# Patient Record
Sex: MALE | Race: WHITE | NOT HISPANIC OR LATINO | Employment: UNEMPLOYED | URBAN - METROPOLITAN AREA
[De-identification: names, ages, dates, MRNs, and addresses within clinical notes are randomized per-mention and may not be internally consistent; named-entity substitution may affect disease eponyms.]

---

## 2019-01-01 ENCOUNTER — HOSPITAL ENCOUNTER (INPATIENT)
Facility: HOSPITAL | Age: 59
LOS: 1 days | End: 2019-02-11
Attending: EMERGENCY MEDICINE | Admitting: PSYCHIATRY & NEUROLOGY
Payer: MEDICAID

## 2019-01-01 VITALS
WEIGHT: 287 LBS | HEIGHT: 72 IN | DIASTOLIC BLOOD PRESSURE: 62 MMHG | BODY MASS INDEX: 38.87 KG/M2 | SYSTOLIC BLOOD PRESSURE: 134 MMHG | TEMPERATURE: 98 F

## 2019-01-01 DIAGNOSIS — S06.5XAA SDH (SUBDURAL HEMATOMA): ICD-10-CM

## 2019-01-01 DIAGNOSIS — R41.89 UNRESPONSIVE: ICD-10-CM

## 2019-01-01 LAB
ABO + RH BLD: NORMAL
ALBUMIN SERPL BCP-MCNC: 1.8 G/DL
ALLENS TEST: ABNORMAL
ALP SERPL-CCNC: 79 U/L
ALT SERPL W/O P-5'-P-CCNC: 58 U/L
ANION GAP SERPL CALC-SCNC: 6 MMOL/L
ANISOCYTOSIS BLD QL SMEAR: SLIGHT
ANISOCYTOSIS BLD QL SMEAR: SLIGHT
APTT BLDCRRT: 28.5 SEC
AST SERPL-CCNC: 29 U/L
AV INDEX (PROSTH): 0.99
AV MEAN GRADIENT: 9.2 MMHG
AV PEAK GRADIENT: 15.84 MMHG
AV VALVE AREA: 3.16 CM2
AV VELOCITY RATIO: 0.86
BACTERIA #/AREA URNS AUTO: NORMAL /HPF
BASOPHILS # BLD AUTO: 0.01 K/UL
BASOPHILS NFR BLD: 0 %
BASOPHILS NFR BLD: 0.1 %
BILIRUB SERPL-MCNC: 0.7 MG/DL
BILIRUB UR QL STRIP: NEGATIVE
BLD GP AB SCN CELLS X3 SERPL QL: NORMAL
BLD PROD TYP BPU: NORMAL
BLOOD UNIT EXPIRATION DATE: NORMAL
BLOOD UNIT TYPE CODE: 6200
BLOOD UNIT TYPE: NORMAL
BSA FOR ECHO PROCEDURE: 2.57 M2
BUN SERPL-MCNC: 41 MG/DL
CALCIUM SERPL-MCNC: 8.5 MG/DL
CHLORIDE SERPL-SCNC: 106 MMOL/L
CHOLEST SERPL-MCNC: 86 MG/DL
CHOLEST/HDLC SERPL: 3.6 {RATIO}
CLARITY UR REFRACT.AUTO: CLEAR
CO2 SERPL-SCNC: 25 MMOL/L
CODING SYSTEM: NORMAL
COLOR UR AUTO: YELLOW
CREAT SERPL-MCNC: 2.3 MG/DL
CV ECHO LV RWT: 0.29 CM
DELSYS: ABNORMAL
DIFFERENTIAL METHOD: ABNORMAL
DIFFERENTIAL METHOD: ABNORMAL
DISPENSE STATUS: NORMAL
DOP CALC AO PEAK VEL: 1.99 M/S
DOP CALC AO VTI: 30.39 CM
DOP CALC LVOT AREA: 3.2 CM2
DOP CALC LVOT DIAMETER: 2.02 CM
DOP CALC LVOT PEAK VEL: 1.7 M/S
DOP CALC LVOT STROKE VOLUME: 96.03 CM3
DOP CALCLVOT PEAK VEL VTI: 29.98 CM
E WAVE DECELERATION TIME: 187.75 MSEC
E/A RATIO: 1.04
E/E' RATIO: 8.45
ECHO LV POSTERIOR WALL: 0.8 CM (ref 0.6–1.1)
EOSINOPHIL # BLD AUTO: 0.1 K/UL
EOSINOPHIL NFR BLD: 0.5 %
EOSINOPHIL NFR BLD: 1 %
ERYTHROCYTE [DISTWIDTH] IN BLOOD BY AUTOMATED COUNT: 18.4 %
ERYTHROCYTE [DISTWIDTH] IN BLOOD BY AUTOMATED COUNT: 18.5 %
ERYTHROCYTE [SEDIMENTATION RATE] IN BLOOD BY WESTERGREN METHOD: 24 MM/H
EST. GFR  (AFRICAN AMERICAN): 34.9 ML/MIN/1.73 M^2
EST. GFR  (NON AFRICAN AMERICAN): 30.2 ML/MIN/1.73 M^2
ESTIMATED AVG GLUCOSE: 137 MG/DL
FIO2: 60
FRACTIONAL SHORTENING: 38 % (ref 28–44)
GIANT PLATELETS BLD QL SMEAR: PRESENT
GLUCOSE SERPL-MCNC: 125 MG/DL
GLUCOSE UR QL STRIP: NEGATIVE
HBA1C MFR BLD HPLC: 6.4 %
HCO3 UR-SCNC: 25.7 MMOL/L (ref 24–28)
HCT VFR BLD AUTO: 22.5 %
HCT VFR BLD AUTO: 22.7 %
HDLC SERPL-MCNC: 24 MG/DL
HDLC SERPL: 27.9 %
HGB BLD-MCNC: 6.9 G/DL
HGB BLD-MCNC: 7 G/DL
HGB UR QL STRIP: ABNORMAL
HYALINE CASTS UR QL AUTO: 1 /LPF
HYPOCHROMIA BLD QL SMEAR: ABNORMAL
IMM GRANULOCYTES # BLD AUTO: 0.13 K/UL
IMM GRANULOCYTES # BLD AUTO: ABNORMAL K/UL
IMM GRANULOCYTES NFR BLD AUTO: 1.3 %
IMM GRANULOCYTES NFR BLD AUTO: ABNORMAL %
INR PPP: 1.1
INTERVENTRICULAR SEPTUM: 0.72 CM (ref 0.6–1.1)
KETONES UR QL STRIP: NEGATIVE
LA MAJOR: 4.89 CM
LA MINOR: 4.76 CM
LA WIDTH: 4.04 CM
LACTATE SERPL-SCNC: 0.9 MMOL/L
LDLC SERPL CALC-MCNC: 33 MG/DL
LEFT ATRIUM SIZE: 2.85 CM
LEFT ATRIUM VOLUME INDEX: 19 ML/M2
LEFT ATRIUM VOLUME: 47.21 CM3
LEFT INTERNAL DIMENSION IN SYSTOLE: 3.41 CM (ref 2.1–4)
LEFT VENTRICLE DIASTOLIC VOLUME INDEX: 59.86 ML/M2
LEFT VENTRICLE DIASTOLIC VOLUME: 148.68 ML
LEFT VENTRICLE MASS INDEX: 60.8 G/M2
LEFT VENTRICLE SYSTOLIC VOLUME INDEX: 19.2 ML/M2
LEFT VENTRICLE SYSTOLIC VOLUME: 47.76 ML
LEFT VENTRICULAR INTERNAL DIMENSION IN DIASTOLE: 5.52 CM (ref 3.5–6)
LEFT VENTRICULAR MASS: 150.96 G
LEUKOCYTE ESTERASE UR QL STRIP: NEGATIVE
LV LATERAL E/E' RATIO: 8.45
LV SEPTAL E/E' RATIO: 8.45
LYMPHOCYTES # BLD AUTO: 2.3 K/UL
LYMPHOCYTES NFR BLD: 22.3 %
LYMPHOCYTES NFR BLD: 84 %
MAGNESIUM SERPL-MCNC: 1.4 MG/DL
MCH RBC QN AUTO: 29.9 PG
MCH RBC QN AUTO: 31.1 PG
MCHC RBC AUTO-ENTMCNC: 30.7 G/DL
MCHC RBC AUTO-ENTMCNC: 30.8 G/DL
MCV RBC AUTO: 101 FL
MCV RBC AUTO: 97 FL
MICROSCOPIC COMMENT: NORMAL
MIN VOL: 10.2
MODE: ABNORMAL
MONOCYTES # BLD AUTO: 7.2 K/UL
MONOCYTES NFR BLD: 1 %
MONOCYTES NFR BLD: 70.2 %
MV PEAK A VEL: 0.89 M/S
MV PEAK E VEL: 0.93 M/S
NEUTROPHILS # BLD AUTO: 0.6 K/UL
NEUTROPHILS NFR BLD: 5 %
NEUTROPHILS NFR BLD: 5.6 %
NEUTS BAND NFR BLD MANUAL: 1 %
NITRITE UR QL STRIP: NEGATIVE
NONHDLC SERPL-MCNC: 62 MG/DL
NRBC BLD-RTO: 0 /100 WBC
NRBC BLD-RTO: 0 /100 WBC
OVALOCYTES BLD QL SMEAR: ABNORMAL
OVALOCYTES BLD QL SMEAR: ABNORMAL
PATH REV BLD -IMP: NORMAL
PCO2 BLDA: 51.1 MMHG (ref 35–45)
PEEP: 5
PH SMN: 7.31 [PH] (ref 7.35–7.45)
PH UR STRIP: 6 [PH] (ref 5–8)
PIP: 37
PLATELET # BLD AUTO: 15 K/UL
PLATELET # BLD AUTO: 42 K/UL
PLATELET BLD QL SMEAR: ABNORMAL
PMV BLD AUTO: 10.2 FL
PMV BLD AUTO: 11.4 FL
PO2 BLDA: 151 MMHG (ref 80–100)
POC BE: -1 MMOL/L
POC SATURATED O2: 99 % (ref 95–100)
POC TCO2: 27 MMOL/L (ref 23–27)
POCT GLUCOSE: 119 MG/DL (ref 70–110)
POCT GLUCOSE: 136 MG/DL (ref 70–110)
POIKILOCYTOSIS BLD QL SMEAR: SLIGHT
POIKILOCYTOSIS BLD QL SMEAR: SLIGHT
POLYCHROMASIA BLD QL SMEAR: ABNORMAL
POTASSIUM SERPL-SCNC: 4.5 MMOL/L
PROT SERPL-MCNC: 9.6 G/DL
PROT UR QL STRIP: ABNORMAL
PROTHROMBIN TIME: 11.4 SEC
PULM VEIN S/D RATIO: 1.43
PV PEAK D VEL: 0.42 M/S
PV PEAK S VEL: 0.6 M/S
RBC # BLD AUTO: 2.22 M/UL
RBC # BLD AUTO: 2.34 M/UL
RBC #/AREA URNS AUTO: 0 /HPF (ref 0–4)
SAMPLE: ABNORMAL
SINUS: 2.97 CM
SITE: ABNORMAL
SMUDGE CELLS BLD QL SMEAR: PRESENT
SODIUM SERPL-SCNC: 137 MMOL/L
SP GR UR STRIP: 1.01 (ref 1–1.03)
SP02: 97
TDI LATERAL: 0.11
TDI SEPTAL: 0.11
TDI: 0.11
TRANS PLATPHERESIS VOL PATIENT: NORMAL ML
TRIGL SERPL-MCNC: 145 MG/DL
TROPONIN I SERPL DL<=0.01 NG/ML-MCNC: 0.02 NG/ML
TSH SERPL DL<=0.005 MIU/L-ACNC: 0.58 UIU/ML
TSH SERPL DL<=0.005 MIU/L-ACNC: 0.58 UIU/ML
URN SPEC COLLECT METH UR: ABNORMAL
VT: 500
WBC # BLD AUTO: 10.32 K/UL
WBC # BLD AUTO: 9.49 K/UL
WBC #/AREA URNS AUTO: 1 /HPF (ref 0–5)

## 2019-01-01 PROCEDURE — 94002 VENT MGMT INPAT INIT DAY: CPT

## 2019-01-01 PROCEDURE — 82962 GLUCOSE BLOOD TEST: CPT

## 2019-01-01 PROCEDURE — 84443 ASSAY THYROID STIM HORMONE: CPT

## 2019-01-01 PROCEDURE — 27000221 HC OXYGEN, UP TO 24 HOURS

## 2019-01-01 PROCEDURE — P9035 PLATELET PHERES LEUKOREDUCED: HCPCS

## 2019-01-01 PROCEDURE — 99900026 HC AIRWAY MAINTENANCE (STAT)

## 2019-01-01 PROCEDURE — 82803 BLOOD GASES ANY COMBINATION: CPT

## 2019-01-01 PROCEDURE — 96375 TX/PRO/DX INJ NEW DRUG ADDON: CPT

## 2019-01-01 PROCEDURE — 80061 LIPID PANEL: CPT

## 2019-01-01 PROCEDURE — A4216 STERILE WATER/SALINE, 10 ML: HCPCS | Performed by: NURSE PRACTITIONER

## 2019-01-01 PROCEDURE — 25000003 PHARM REV CODE 250: Performed by: NURSE PRACTITIONER

## 2019-01-01 PROCEDURE — 83036 HEMOGLOBIN GLYCOSYLATED A1C: CPT

## 2019-01-01 PROCEDURE — 99283 EMERGENCY DEPT VISIT LOW MDM: CPT | Mod: ,,, | Performed by: NURSE PRACTITIONER

## 2019-01-01 PROCEDURE — 36430 TRANSFUSION BLD/BLD COMPNT: CPT

## 2019-01-01 PROCEDURE — 85027 COMPLETE CBC AUTOMATED: CPT

## 2019-01-01 PROCEDURE — 63600175 PHARM REV CODE 636 W HCPCS: Performed by: EMERGENCY MEDICINE

## 2019-01-01 PROCEDURE — 25000003 PHARM REV CODE 250: Performed by: PHYSICIAN ASSISTANT

## 2019-01-01 PROCEDURE — 99233 SBSQ HOSP IP/OBS HIGH 50: CPT | Mod: ,,, | Performed by: NEUROLOGICAL SURGERY

## 2019-01-01 PROCEDURE — 93010 ELECTROCARDIOGRAM REPORT: CPT | Mod: ,,, | Performed by: INTERNAL MEDICINE

## 2019-01-01 PROCEDURE — 83735 ASSAY OF MAGNESIUM: CPT

## 2019-01-01 PROCEDURE — 85730 THROMBOPLASTIN TIME PARTIAL: CPT

## 2019-01-01 PROCEDURE — 99291 PR CRITICAL CARE, E/M 30-74 MINUTES: ICD-10-PCS | Mod: 25,,, | Performed by: EMERGENCY MEDICINE

## 2019-01-01 PROCEDURE — 36620 INSERTION CATHETER ARTERY: CPT | Mod: 59,,, | Performed by: EMERGENCY MEDICINE

## 2019-01-01 PROCEDURE — 27200966 HC CLOSED SUCTION SYSTEM

## 2019-01-01 PROCEDURE — 99291 CRITICAL CARE FIRST HOUR: CPT | Mod: 25,,, | Performed by: EMERGENCY MEDICINE

## 2019-01-01 PROCEDURE — 99292 PR CRITICAL CARE, ADDL 30 MIN: ICD-10-PCS | Mod: 25,,, | Performed by: EMERGENCY MEDICINE

## 2019-01-01 PROCEDURE — 85060 BLOOD SMEAR INTERPRETATION: CPT | Mod: ,,, | Performed by: PATHOLOGY

## 2019-01-01 PROCEDURE — 85060 PATHOLOGIST REVIEW: ICD-10-PCS | Mod: ,,, | Performed by: PATHOLOGY

## 2019-01-01 PROCEDURE — 86850 RBC ANTIBODY SCREEN: CPT

## 2019-01-01 PROCEDURE — 85025 COMPLETE CBC W/AUTO DIFF WBC: CPT

## 2019-01-01 PROCEDURE — 94761 N-INVAS EAR/PLS OXIMETRY MLT: CPT

## 2019-01-01 PROCEDURE — 84484 ASSAY OF TROPONIN QUANT: CPT

## 2019-01-01 PROCEDURE — 36620 PR INSERT CATH,ART,PERCUT,SHORTTERM: ICD-10-PCS | Mod: 59,,, | Performed by: EMERGENCY MEDICINE

## 2019-01-01 PROCEDURE — 93010 EKG 12-LEAD: ICD-10-PCS | Mod: ,,, | Performed by: INTERNAL MEDICINE

## 2019-01-01 PROCEDURE — 36556 PR INSERT NON-TUNNEL CV CATH 5+ YRS OLD: ICD-10-PCS | Mod: 51,,, | Performed by: EMERGENCY MEDICINE

## 2019-01-01 PROCEDURE — 63600175 PHARM REV CODE 636 W HCPCS: Performed by: INTERNAL MEDICINE

## 2019-01-01 PROCEDURE — 36620 INSERTION CATHETER ARTERY: CPT

## 2019-01-01 PROCEDURE — 99283 PR EMERGENCY DEPT VISIT,LEVEL III: ICD-10-PCS | Mod: ,,, | Performed by: NURSE PRACTITIONER

## 2019-01-01 PROCEDURE — 99292 CRITICAL CARE ADDL 30 MIN: CPT | Mod: 25,,, | Performed by: EMERGENCY MEDICINE

## 2019-01-01 PROCEDURE — 80053 COMPREHEN METABOLIC PANEL: CPT

## 2019-01-01 PROCEDURE — 83605 ASSAY OF LACTIC ACID: CPT

## 2019-01-01 PROCEDURE — 85610 PROTHROMBIN TIME: CPT

## 2019-01-01 PROCEDURE — 25000003 PHARM REV CODE 250: Performed by: EMERGENCY MEDICINE

## 2019-01-01 PROCEDURE — 85007 BL SMEAR W/DIFF WBC COUNT: CPT

## 2019-01-01 PROCEDURE — 81001 URINALYSIS AUTO W/SCOPE: CPT

## 2019-01-01 PROCEDURE — 96365 THER/PROPH/DIAG IV INF INIT: CPT

## 2019-01-01 PROCEDURE — 99900035 HC TECH TIME PER 15 MIN (STAT)

## 2019-01-01 PROCEDURE — 93005 ELECTROCARDIOGRAM TRACING: CPT

## 2019-01-01 PROCEDURE — 99233 PR SUBSEQUENT HOSPITAL CARE,LEVL III: ICD-10-PCS | Mod: ,,, | Performed by: NEUROLOGICAL SURGERY

## 2019-01-01 PROCEDURE — 20000000 HC ICU ROOM

## 2019-01-01 PROCEDURE — 99291 CRITICAL CARE FIRST HOUR: CPT

## 2019-01-01 PROCEDURE — 36556 INSERT NON-TUNNEL CV CATH: CPT | Mod: 51,,, | Performed by: EMERGENCY MEDICINE

## 2019-01-01 PROCEDURE — 99292 CRITICAL CARE ADDL 30 MIN: CPT

## 2019-01-01 PROCEDURE — 37799 UNLISTED PX VASCULAR SURGERY: CPT

## 2019-01-01 RX ORDER — 3% SODIUM CHLORIDE 3 G/100ML
250 INJECTION, SOLUTION INTRAVENOUS CONTINUOUS
Status: DISCONTINUED | OUTPATIENT
Start: 2019-01-01 | End: 2019-01-01

## 2019-01-01 RX ORDER — FAMOTIDINE 20 MG/1
20 TABLET, FILM COATED ORAL DAILY
Status: DISCONTINUED | OUTPATIENT
Start: 2019-01-01 | End: 2019-01-01

## 2019-01-01 RX ORDER — MORPHINE SULFATE 1 MG/ML
2 INJECTION, SOLUTION INTRAVENOUS CONTINUOUS
Status: DISCONTINUED | OUTPATIENT
Start: 2019-01-01 | End: 2019-02-12 | Stop reason: HOSPADM

## 2019-01-01 RX ORDER — LEVETIRACETAM 10 MG/ML
1000 INJECTION INTRAVASCULAR
Status: COMPLETED | OUTPATIENT
Start: 2019-01-01 | End: 2019-01-01

## 2019-01-01 RX ORDER — NOREPINEPHRINE BITARTRATE/D5W 4MG/250ML
0.05 PLASTIC BAG, INJECTION (ML) INTRAVENOUS CONTINUOUS
Status: DISCONTINUED | OUTPATIENT
Start: 2019-01-01 | End: 2019-01-01

## 2019-01-01 RX ORDER — ACETAMINOPHEN 325 MG/1
650 TABLET ORAL EVERY 6 HOURS PRN
Status: DISCONTINUED | OUTPATIENT
Start: 2019-01-01 | End: 2019-02-12 | Stop reason: HOSPADM

## 2019-01-01 RX ORDER — CHLORHEXIDINE GLUCONATE ORAL RINSE 1.2 MG/ML
15 SOLUTION DENTAL 2 TIMES DAILY
Status: DISCONTINUED | OUTPATIENT
Start: 2019-01-01 | End: 2019-02-12 | Stop reason: HOSPADM

## 2019-01-01 RX ORDER — LEVETIRACETAM 500 MG/1
500 TABLET ORAL 2 TIMES DAILY
Status: DISCONTINUED | OUTPATIENT
Start: 2019-01-01 | End: 2019-01-01

## 2019-01-01 RX ORDER — SODIUM CHLORIDE 9 MG/ML
INJECTION, SOLUTION INTRAVENOUS CONTINUOUS
Status: DISCONTINUED | OUTPATIENT
Start: 2019-01-01 | End: 2019-02-12 | Stop reason: HOSPADM

## 2019-01-01 RX ORDER — MANNITOL 250 MG/ML
100 INJECTION, SOLUTION INTRAVENOUS
Status: COMPLETED | OUTPATIENT
Start: 2019-01-01 | End: 2019-01-01

## 2019-01-01 RX ORDER — SODIUM CHLORIDE 0.9 % (FLUSH) 0.9 %
3 SYRINGE (ML) INJECTION EVERY 8 HOURS
Status: DISCONTINUED | OUTPATIENT
Start: 2019-01-01 | End: 2019-01-01

## 2019-01-01 RX ORDER — MANNITOL 250 MG/ML
126 INJECTION, SOLUTION INTRAVENOUS
Status: DISCONTINUED | OUTPATIENT
Start: 2019-01-01 | End: 2019-01-01

## 2019-01-01 RX ORDER — AMOXICILLIN 250 MG
2 CAPSULE ORAL DAILY
Status: DISCONTINUED | OUTPATIENT
Start: 2019-01-01 | End: 2019-01-01

## 2019-01-01 RX ORDER — HYDROCODONE BITARTRATE AND ACETAMINOPHEN 500; 5 MG/1; MG/1
TABLET ORAL
Status: DISCONTINUED | OUTPATIENT
Start: 2019-01-01 | End: 2019-02-12 | Stop reason: HOSPADM

## 2019-01-01 RX ADMIN — Medication 0.05 MCG/KG/MIN: at 04:02

## 2019-01-01 RX ADMIN — MORPHINE SULFATE 2 MG/HR: 1 INJECTION, SOLUTION INTRAVENOUS at 08:02

## 2019-01-01 RX ADMIN — MANNITOL 25 G: 12.5 INJECTION, SOLUTION INTRAVENOUS at 05:02

## 2019-01-01 RX ADMIN — SODIUM CHLORIDE 250 ML: 3 INJECTION, SOLUTION INTRAVENOUS at 03:02

## 2019-01-01 RX ADMIN — Medication 3 ML: at 05:02

## 2019-01-01 RX ADMIN — SODIUM CHLORIDE: 0.9 INJECTION, SOLUTION INTRAVENOUS at 07:02

## 2019-01-01 RX ADMIN — HUMAN ALBUMIN MICROSPHERES AND PERFLUTREN 0.66 MG: 10; .22 INJECTION, SOLUTION INTRAVENOUS at 03:02

## 2019-01-01 RX ADMIN — CHLORHEXIDINE GLUCONATE 15 ML: 1.2 RINSE ORAL at 09:02

## 2019-01-01 RX ADMIN — SODIUM CHLORIDE 1000 ML: 0.9 INJECTION, SOLUTION INTRAVENOUS at 03:02

## 2019-01-01 RX ADMIN — LEVETIRACETAM 1000 MG: 10 INJECTION INTRAVENOUS at 05:02

## 2019-01-01 RX ADMIN — NOREPINEPHRINE BITARTRATE 0.15 MCG/KG/MIN: 1 INJECTION, SOLUTION, CONCENTRATE INTRAVENOUS at 07:02

## 2019-02-11 PROBLEM — J96.02 ACUTE RESPIRATORY FAILURE WITH HYPOXIA AND HYPERCAPNIA: Status: ACTIVE | Noted: 2019-01-01

## 2019-02-11 PROBLEM — S06.5XAA SDH (SUBDURAL HEMATOMA): Status: ACTIVE | Noted: 2019-01-01

## 2019-02-11 PROBLEM — G93.5 BRAIN COMPRESSION: Status: ACTIVE | Noted: 2019-01-01

## 2019-02-11 PROBLEM — R40.2430 GLASGOW COMA SCALE TOTAL SCORE 3-8: Status: ACTIVE | Noted: 2019-01-01

## 2019-02-11 PROBLEM — G93.6 CYTOTOXIC BRAIN EDEMA: Status: ACTIVE | Noted: 2019-01-01

## 2019-02-11 PROBLEM — J96.01 ACUTE RESPIRATORY FAILURE WITH HYPOXIA AND HYPERCAPNIA: Status: ACTIVE | Noted: 2019-01-01

## 2019-02-11 PROBLEM — R57.8 NEUROGENIC SHOCK: Status: ACTIVE | Noted: 2019-01-01

## 2019-02-11 PROBLEM — D62 ACUTE BLOOD LOSS ANEMIA: Status: ACTIVE | Noted: 2019-01-01

## 2019-02-11 PROBLEM — E11.65 TYPE 2 DIABETES MELLITUS WITH HYPERGLYCEMIA, WITHOUT LONG-TERM CURRENT USE OF INSULIN: Status: ACTIVE | Noted: 2019-01-01

## 2019-02-11 PROBLEM — R41.89 UNRESPONSIVE: Status: ACTIVE | Noted: 2019-01-01

## 2019-02-11 NOTE — PROGRESS NOTES
0344 patient received from EMS services on transport ventilator with oxygen attached. Patient brought straight to CT scan. 0359 Patient brought to ED bed 1 and was placed on  ventilator initial settings A/C 16, , Fio2 60%, and Peep +5 as per EMS settings and MDS orders. Patients intubated with 8.0 OETT which is at 20cm juliet at lips and is on right side. RT to follow.

## 2019-02-11 NOTE — HOSPITAL COURSE
2/11 Admit to NCC, Mannitol, CTH, IVF.  2/11 Family wishes to withdraw care. Extubated, fluids stopped and morphine drip began at 2045. Called for asystole in the monitor around 22:02

## 2019-02-11 NOTE — PROGRESS NOTES
Pt on vent. optison given ivp via left upper arm sl for imaging. Tolerated well. Sl flushed before and after with 10 cc ns.

## 2019-02-11 NOTE — CONSULTS
Consult Note  Neurosurgery    Admit Date: 2/11/2019  LOS: 0    Code Status: Full Code     CC: <principal problem not specified>    SUBJECTIVE:     History of Present Illness: 57 yo male with pmh of MM found down by family after indeterminate amount of time. Reported non-compliance with chemotherapeutics and outside report of thrombocytopenia at 20k.    On exam pt is unarousable, has bilateral blown and fixed pupils, no movement to pain, and no cranial nerve reflexes. Pt is apneic on pressure support.    GCS 3T.    Head CT shows large right holohemispheric acute SDH with significant mass effect and 2cm of midline shift and obliteration of basal cisterns.         OBJECTIVE:   Vital Signs (Most Recent):   Temp: 96.6 °F (35.9 °C) (02/11/19 0510)  Pulse: 101 (02/11/19 0510)  Resp: (!) 24 (02/11/19 0510)  BP: (!) 98/54 (02/11/19 0510)  SpO2: 95 % (02/11/19 0510)    Vital Signs (24h Range):   Temp:  [86.5 °F (30.3 °C)-96.6 °F (35.9 °C)] 96.6 °F (35.9 °C)  Pulse:  [101-136] 101  Resp:  [16-36] 24  SpO2:  [94 %-97 %] 95 %  BP: ()/(34-65) 98/54  Arterial Line BP: ()/(48-60) 106/60    ICP/CPP (Last 24h):        I & O (Last 24h):    Intake/Output Summary (Last 24 hours) at 2/11/2019 0513  Last data filed at 2/11/2019 0510  Gross per 24 hour   Intake 1350 ml   Output --   Net 1350 ml       Physical Exam:  Neuro:  GCS [id number]    Pupils: Blown and fixed bilaterally.    Cranial Nerve Reflexes:  Corneal:  Absent.  Oculocephalics: Absent.  Cough/Gag:Absent.    Motor Exam:  Left Upper Extremity: No movement.  Right Upper Extremity: No movement.  Left Lower Extremity: No movement.  Right Lower Extremity: No movement.          Lines/Drains/Airway:        Airway - Non-Surgical 02/11/19 (Active)   Secured at 20 cm 2/11/2019  4:32 AM   Measured At Lips 2/11/2019  4:32 AM   Secured Location Right 2/11/2019  4:32 AM   Secured by Commercial tube mueller 2/11/2019  4:32 AM   Bite Block right 2/11/2019  4:32 AM   Site Condition  Cool;Dry 2/11/2019  4:32 AM   Status Intact;Secured;Patent 2/11/2019  4:32 AM   Site Assessment Clean;Dry;No bleeding;No drainage 2/11/2019  4:32 AM           Percutaneous Central Line Insertion/Assessment - triple lumen  02/11/19 0423 right femoral vein (Active)   Dressing biopatch in place;dressing dry and intact 2/11/2019  4:39 AM   Securement secured w/ sutures 2/11/2019  4:39 AM   Distal Patency/Care flushed w/o difficulty;blood return present;normal saline locked 2/11/2019  4:39 AM   Medial Patency/Care flushed w/o difficulty 2/11/2019  4:39 AM   Proximal Patency/Care flushed w/o difficulty 2/11/2019  4:39 AM           Arterial Line 02/11/19 0435 Right Radial (Active)   Site Assessment Dry;Intact;Clean 2/11/2019  4:42 AM   Arterial Line Interventions Zeroed and calibrated;Leveled 2/11/2019  4:42 AM   Dressing Status Biopatch in place;Clean;Dry;Intact 2/11/2019  4:42 AM       Nutrition/Tube Feeds:   Current Diet Order   Procedures    Diet NPO     No food intake until passes NOLVIA or evaluated by speech.       Labs:  ABG:   Recent Labs   Lab 02/11/19  0450   PH 7.309*   PO2 151*   PCO2 51.1*   HCO3 25.7   POCSATURATED 99   BE -1     BMP:No results for input(s): NA, K, CL, CO2, BUN, CREATININE, GLU, MG, PHOS in the last 24 hours.  LFT: No results found for: AST, ALT, GGT, ALKPHOS, BILITOT, ALBUMIN, PROT  CBC:   Lab Results   Component Value Date    WBC 10.53 02/11/2019    HGB 5.1 (LL) 02/11/2019    HCT 17.6 (LL) 02/11/2019     (H) 02/11/2019     Microbiology x 7d:   Microbiology Results (last 7 days)     ** No results found for the last 168 hours. **            ASSESSMENT/PLAN:    59 yo male with pmh of MM now with large right holohemispheric acute SDH with severe mass effect. 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    No neurosurgical intervention indicated in setting of non-survivable brain injury. Discussed above with pt family.    --No acute neurosurgical  intervention.  --Continue care per primary team and family wishes.  --Neurosurgery will be signing off, please contact us with any questions or concerns.      Abraham Sandoval

## 2019-02-11 NOTE — PT/OT/SLP PROGRESS
Physical Therapy      Patient Name:  Esteban Manzo   MRN:  [id number]     PT orders d/c at this time- Pt currently intubated and unresponsive. Pt not appropriate for PT services at this time. Please re consult if situation changes        Renetta Lawrence, PT, DPT  2/11/2019

## 2019-02-11 NOTE — PT/OT/SLP PROGRESS
Occupational Therapy  Discontinued      Patient Name:  Esteban Manzo   MRN:  [id number]    OT orders d/c at this time- Pt currently intubated and unresponsive with charted fixed dialted pupils. Pt not appropriate for OT services at this time. Please re consult if situation changes.     KAITLYNN Hennessy  2/11/2019

## 2019-02-11 NOTE — HPI
The patient is a 57 yo male with PMH of Bone Cancer who presents as a transfer from [place] for evaluation and management  of a SDH. He is nonverbal and there is no available family therefore history and events are obtained from medical records. According to EMS, the patient's special needs son found him down and with difficulty arousing. [facility] was taken to [facility] ED for complaint of lethargy. Upon assessment he displayed minimal response to painful stimuli but continued nonverbal with decreased alertness. CT imaging identified L SDH and he was therefore transferred to [facility] for higher level of care. According to the family patient has a low platelet count due to bone cancer with no chemo treatments; there is no other available information relative to history, onset, worsening/associated symptoms.     Upon assessment at [facility], the patient is intubated without sedation, no family at bedside. His pupils are fixed, dilated with negative cough, gag or corneal response. He will be transferred to [facility] for further management.

## 2019-02-11 NOTE — PLAN OF CARE
Problem: Adult Inpatient Plan of Care  Goal: Plan of Care Review  Outcome: Ongoing (interventions implemented as appropriate)  POC reviewed with pt and family at 1700. Family verbalized understanding that pt will transition to comfort care after all family is present. Pt and family made as comfortable as possible. Will continue to monitor. See flowsheets for full assessment and VS info

## 2019-02-11 NOTE — CONSULTS
Inpatient consult to Physical Medicine Rehab  Consult performed by: Teresa Obrien NP  Consult ordered by: Radha Lombardi NP  Reason for consult: assess rehab needs        Patient is intubated and not appropriate for Post-Acute care evaluation at this time.  Will follow peripherally for medical stability and therapy evaluations if appropriate.       KIANA Cervantes, FNP-C  Physical Medicine & Rehabilitation   02/11/2019  Spectralink: [id number]

## 2019-02-11 NOTE — PLAN OF CARE
Per MD, patient will be comfort care once family arrives.         02/11/19 1545   Discharge Assessment   Assessment Type Discharge Planning Assessment   Confirmed/corrected address and phone number on facesheet? Yes   Assessment information obtained from? Caregiver  (brotherAriel)   Expected Length of Stay (days) 3   Communicated expected length of stay with patient/caregiver yes   Prior to hospitilization cognitive status: Alert/Oriented   Prior to hospitalization functional status: Independent   Current cognitive status: Coma/Sedated/Intubated   Current Functional Status: Completely Dependent   Facility Arrived From: [facility]    Lives With alone   Able to Return to Prior Arrangements no   Is patient able to care for self after discharge? No   Who are your caregiver(s) and their phone number(s)? Ariel Manzo (brother)  [phone]   Patient's perception of discharge disposition other (comments)  (Per MD, family talk regarding Comfort Care. )   Readmission Within the Last 30 Days no previous admission in last 30 days   Patient currently being followed by outpatient case management? No   Patient currently receives any other outside agency services? No   Equipment Currently Used at Home none   Do you have any problems affording any of your prescribed medications? No   Is the patient taking medications as prescribed? yes   Does the patient have transportation home? Yes   Transportation Anticipated other (see comments)   Does the patient receive services at the [facility]? No   Discharge Plan A Other  (Comfort Care )   Discharge Plan B Other  (comfort care)   DME Needed Upon Discharge  none   Patient/Family in Agreement with Plan unable to assess         Discharge/ My Health Packet Folder Given to patient/family:      yes        Emergency Contacts:  Extended Emergency Contact Information  Primary Emergency Contact: Toan Manzo  Mobile Phone: [phone]  Relation: Brother    needed? No  Secondary Emergency Contact: Ariel Manzo  Mobile Phone: [phone]  Relation: Brother   needed? No      Insurance:  Pay[place] / Plan: [place] / Product Type: Government /     Bea Holman RN, CCRN-K, [facility]  Neuro-Critical Care   X [phone]                Bea Holman RN, CCRN-K, [facility]  Neuro-Critical Care   X [phone]

## 2019-02-11 NOTE — LOPA/MORA/SWTA/AOC/AEB
[facility] ([facility])  On-Site Evaluation  [facility] Contact # [phone]        Thank you for the referral of this patient to determine suitability for organ, tissue, and eye donation.  A chart review has been conducted (date):02/11/2019  at (time)  09:29 .  [facility] findings are as follows:    ?     ? Potential for candidate for tissue and eye donation- call [facility] at [phone] within 4 hours of death for screening as a potential tissue and/or eye donor.      ?          [facility] Representative:  Sherlyn Ramon, RN            [facility] Referral Number:   [id number]            Vital Signs Last 24 Hrs  T(C): 36.4 (24 Aug 2018 06:32), Max: 36.4 (23 Aug 2018 11:07)  T(F): 97.5 (24 Aug 2018 06:32), Max: 97.5 (23 Aug 2018 11:07)  HR: 65 (24 Aug 2018 07:24) (60 - 80)  BP: 126/77 (24 Aug 2018 06:32) (105/64 - 130/67)  BP(mean): --  RR: 18 (24 Aug 2018 06:32) (18 - 18)  SpO2: 94% (24 Aug 2018 07:24) (94% - 100%)

## 2019-02-11 NOTE — ASSESSMENT & PLAN NOTE
Admit to [facility]  Neurosurgery Consult  Central Line  Arterial Line  Cardiac Monitoring  Neuro Checks Q1 hr  levetiracetam 500 bid.  Blood Pressure Parameters, SBP < 160.  elevate head of bed 30-45 degrees.  CTH    Na Eunatremic  Glucose Euglycemic

## 2019-02-11 NOTE — H&P
[facility]  Neurocritical Care  History & Physical    Admit Date: 2/11/2019  Service Date: 02/11/2019  Length of Stay: 0    Subjective:     Chief Complaint: SDH (subdural hematoma)    History of Present Illness: The patient is a 59 yo male with PMH of Bone Cancer who presents as a transfer from [place] for evaluation and management  of a SDH. He is nonverbal and there is no available family therefore history and events are obtained from medical records. According to EMS, the patient's special needs son found him down and with difficulty arousing. [facility] was taken to [facility] ED for complaint of lethargy. Upon assessment he displayed minimal response to painful stimuli but continued nonverbal with decreased alertness. CT imaging identified R SDH and he was therefore [place] [facility] [place]. According to the family patient has a low platelet count due to bone cancer with no chemo treatments; there is no other available information relative to history, onset, worsening/associated symptoms.     Upon [place] [facility] the patient is intubated without sedation, no family at bedside. His pupils are fixed, dilated with negative cough, gag or corneal response. He will be transferred to [facility] for further management.              No past medical history on file.  No past surgical history on file.   No current facility-administered medications on file prior to encounter.      No current outpatient medications on file prior to encounter.      Allergies: Iodinated contrast- oral and iv dye    No family history on file.  Social History     Tobacco Use    Smoking status: Not on file   Substance Use Topics    Alcohol use: Not on file    Drug use: Not on file     Review of Systems   Unable to perform ROS: Intubated     Objective:     Vitals:    Temp: 99.5 °F (37.5 °C)  Pulse: 105  BP: (!) 93/48  MAP (mmHg): 68  Resp: (!) 25  SpO2: (!) 94 %  Oxygen Concentration (%): 50  O2 Device (Oxygen  Therapy): ventilator  Vent Mode: A/C  Set Rate: 24 bmp  Vt Set: 500 mL  Pressure Support: 0 cmH20  PEEP/CPAP: 5 cmH20  Peak Airway Pressure: 45 cmH2O  Mean Airway Pressure: 19 cmH20  Plateau Pressure: 20 cmH20    Temp  Min: 86.5 °F (30.3 °C)  Max: 100.2 °F (37.9 °C)  Pulse  Min: 100  Max: 136  BP  Min: 69/36  Max: 132/65  MAP (mmHg)  Min: 47  Max: 81  Resp  Min: 16  Max: 36  SpO2  Min: 93 %  Max: 97 %  Oxygen Concentration (%)  Min: 50  Max: 60    02/10 0701 - 02/11 0700  In: 1450 [I.V.:350]  Out: -            Physical Exam   Constitutional: He appears well-developed. He is intubated.   HENT:   Head: Normocephalic.   Eyes:   Pupils fixed dilated   Neck: No JVD present. No tracheal deviation present.   Cardiovascular: Normal rate and regular rhythm.   Pulmonary/Chest: He is intubated.   Abdominal: Soft.   Neurological:   Non responsive to stimulation, does not open eyes  Intubated  Negative Cough/ gag/ corneal response/ no breaths over vent delivered breaths  Pupils fixed dilated  Unable to test sensory  no facial asymmetry           Unable to test orientation, language, memory, judgment, insight, fund of knowledge, hearing, shoulder shrug, tongue protrusion, coordination, gait due to level of consciousness.    Today I personally reviewed pertinent medications, lines/drains/airways, imaging, laboratory results, notably:        Assessment/Plan:     Neuro   * SDH (subdural hematoma)    Admit to [facility]  Neurosurgery Consult  Central Line  Arterial Line  Cardiac Monitoring  Neuro Checks Q1 hr  levetiracetam 500 bid.  Blood Pressure Parameters, SBP < 160.  elevate head of bed 30-45 degrees.  CTH    Na Eunatremic  Glucose Euglycemic                 The patient is being Prophylaxed for:  Venous Thromboembolism with: Mechanical  Stress Ulcer with: None  Ventilator Pneumonia with: chlorhexidine oral care    Activity Orders          Commode at bedside starting at 02/11 2993        Full Code    Radha Lombardi NP  Neurocritical  Care [facility]

## 2019-02-11 NOTE — PROGRESS NOTES
Patient arrived to [facility] from [place] to ED to 9013    Type of stroke/diagnosis: SDH and unresponsive    TPA start and end time (if applicable) NA    Thrombectomy start and end time (if applicable) NA    Current symptoms: unresponsive    Skin assessment done: Y  Wounds noted: generalized brusing, blanchable redness to bottom, cracked dry heels    NCC notified: MANUELITO Urias NP

## 2019-02-11 NOTE — ED PROVIDER NOTES
Encounter Date: 2/11/2019    SCRIBE #1 NOTE: I, Herson Canchola, am scribing for, and in the presence of,  Tnoi Tabares. I have scribed the entire note.       History     Chief Complaint   Patient presents with    Transfer     from [place] for Neurosurgery     Time patient was seen by the provider: 4:02 AM      The patient is a 58 y.o. Male who presents to the ED by EMS with a complaint of lethargy. Patient was unconscious upon arrival. History provided by EMS. Patient was Lethargic for entire day. Handicapped son was unable to wake him. Somewhat responsive to painful stimuli. Never alert or verbal. Per EMS BP was fine until 3 minutes in parking lot of ED.  No allergies. . According to the family patient has a low platelet count due to bone cancer with no chemo treatments.        The history is provided by the EMS personnel.     Review of patient's allergies indicates:   Allergen Reactions    Iodinated contrast- oral and iv dye      unkown     No past medical history on file.  No past surgical history on file.  No family history on file.  Social History     Tobacco Use    Smoking status: Not on file   Substance Use Topics    Alcohol use: Not on file    Drug use: Not on file     Review of Systems   Unable to perform ROS: Acuity of condition       Physical Exam     Initial Vitals [02/11/19 0353]   BP Pulse Resp Temp SpO2   (!) 83/48 (!) 136 (!) 24 (!) 90.9 °F (32.7 °C) 97 %      MAP       --         Physical Exam    Constitutional: He appears well-developed and well-nourished. He is not diaphoretic. No distress.   HENT:   Head: Normocephalic and atraumatic.   Eyes:   Bilateral pupils fixed and dialated   Neck: No thyromegaly present. No tracheal deviation present.   Cardiovascular: Regular rhythm and normal heart sounds.   Pulmonary/Chest: He has rhonchi.   Abdominal: Soft. Bowel sounds are normal. He exhibits no distension.   Genitourinary: Penis normal.   Musculoskeletal: He exhibits no edema.  "  Neurological:   GCS 3, no corneal relfelx, no gag reflex   Skin: Skin is warm.         ED Course   Arterial Line  Date/Time: 2/11/2019 4:42 AM  Performed by: Nancy Braswell MD  Authorized by: Nancy Braswell MD   Consent Done: Emergent Situation  Preparation: Patient was prepped and draped in the usual sterile fashion.  Indications: multiple ABGs, respiratory failure and hemodynamic monitoring  Location: right radial  Patient sedated: yes  Sedation type: deep sedation  (See MAR for exact dosages of medications).  Sedatives: fentanyl  Analgesia: fentanyl  Seldinger technique: Seldinger technique used  Number of attempts: 1  Complications: No  Post-procedure: line sutured and dressing applied  Post-procedure CMS: normal and unchanged  Patient tolerance: Patient tolerated the procedure well with no immediate complications    Central Line  Date/Time: 2/11/2019 4:42 AM  Performed by: Nancy Braswell MD  Consent Done: Emergent Situation  Time out: Immediately prior to procedure a "time out" was called to verify the correct patient, procedure, equipment, support staff and site/side marked as required.  Indications: med administration and vascular access  Preparation: skin prepped with chlorhexidine (without alcohol)  Skin prep agent dried: skin prep agent completely dried prior to procedure  Sterile barriers: all five maximum sterile barriers used - cap, mask, sterile gown, sterile gloves, and large sterile sheet  Hand hygiene: hand hygiene performed prior to central venous catheter insertion  Location details: right femoral  Site selection rationale: Emergent situation  (Emergent situation )  Catheter type: triple lumen  Catheter size: 7 Fr  Ultrasound guidance: yes  Vessel Caliber: medium, patent, compressibility normal  Needle advanced into vessel with real time Ultrasound guidance.  Guidewire confirmed in vessel.  Sterile sheath used.  Number of attempts: 1  Assessment: successful " placement  Complications: none  Post-procedure: line sutured,  chlorhexidine patch,  sterile dressing applied and blood return through all ports  Complications: No    Critical Care  Date/Time: 2/11/2019 4:42 AM  Performed by: Nancy Braswell MD  Authorized by: Nancy Braswell MD   Direct patient critical care time: 90 minutes  Additional history critical care time: 5 minutes  Ordering / reviewing critical care time: 5 minutes  Documentation critical care time: 10 minutes  Consulting other physicians critical care time: 10 minutes  Consult with family critical care time: 15 minutes  Total critical care time (exclusive of procedural time) : 135 minutes  Critical care time was exclusive of separately billable procedures and treating other patients and teaching time.  Critical care was necessary to treat or prevent imminent or life-threatening deterioration of the following conditions: cardiac failure, metabolic crisis, shock, renal failure, CNS failure or compromise and respiratory failure.  Critical care was time spent personally by me on the following activities: blood draw for specimens, development of treatment plan with patient or surrogate, discussions with consultants, evaluation of patient's response to treatment, examination of patient, obtaining history from patient or surrogate, ordering and performing treatments and interventions, ordering and review of laboratory studies, ordering and review of radiographic studies, pulse oximetry, re-evaluation of patient's condition, review of old charts, vascular access procedures and ventilator management.  Subsequent provider of critical care: I assumed direction of critical care for this patient from another provider of my specialty.        Labs Reviewed   URINALYSIS - Abnormal; Notable for the following components:       Result Value    Protein, UA 1+ (*)     Occult Blood UA 1+ (*)     All other components within normal limits   ISTAT PROCEDURE -  Abnormal; Notable for the following components:    POC PH 7.309 (*)     POC PCO2 51.1 (*)     POC PO2 151 (*)     All other components within normal limits   URINALYSIS MICROSCOPIC   PROTIME-INR    Narrative:     Fasting   APTT    Narrative:     Fasting   LACTIC ACID, PLASMA   TYPE & SCREEN   PREPARE PLATELETS (DOSE) SOFT          Imaging Results          X-Ray Abdomen AP 1 View (KUB) (Final result)  Result time 02/11/19 08:09:25    Final result by Manas Arrington DO (02/11/19 08:09:25)                 Impression:      Please see above      Electronically signed by: Manas Arrington DO  Date:    02/11/2019  Time:    08:09             Narrative:    EXAMINATION:  XR ABDOMEN AP 1 VIEW    CLINICAL HISTORY:  Traumatic subdural hemorrhage with loss of consciousness of unspecified duration, initial encounter    TECHNIQUE:  AP View(s) of the abdomen was performed.    COMPARISON:  None    FINDINGS:  Feeding tube tip projected over the mid abdomen region at the L3 vertebral body level in the expected region of the gastric body.  Right-sided central venous catheter and ETT partially included in the study.  Paucity of bowel gas within the visualized upper abdomen.  Further evaluation as warranted clinically.                               X-Ray Chest AP Portable (Final result)  Result time 02/11/19 06:06:29    Final result by Phil Carpenter MD (02/11/19 06:06:29)                 Impression:      Widened mediastinum which could be related to prominent thoracic aorta.  Mediastinal hematoma cannot be excluded.  Correlation is advised.    Endotracheal tube in good position.    Pulmonary edema.    Findings were discussed with Dr. Muñoz on 02/11/2019 at 06:06.      Electronically signed by: Phil Carpenter MD  Date:    02/11/2019  Time:    06:06             Narrative:    EXAMINATION:  XR CHEST AP PORTABLE    CLINICAL HISTORY:  ETT;    TECHNIQUE:  Single frontal view of the chest was performed.    COMPARISON:  None    FINDINGS:  There  is an endotracheal tube the tip of which is at the level of the clavicles.  NG tube is seen coursing towards the stomach.  There is a single-lumen Port-A-Cath in the right chest the tip of which appears to be at the cavoatrial junction.  The trachea is patent.  Cardiac silhouette is enlarged.  There is prominent superior mediastinum which may be related to tortuosity of the aorta.  Correlation is advised.  There is evidence for pulmonary edema.  No effusion, pneumothorax or free air below the diaphragm.  No acute osseous abnormality.                               CT Head Without Contrast (Final result)     Abnormal  Result time 02/11/19 04:59:43    Final result by Dennis Ervin MD (02/11/19 04:59:43)                 Impression:      Large acute subdural collection overlying the right cerebral convexity with small component of subarachnoid hemorrhage and diffuse cerebral edema resulting in subfalcine, central, and tonsillar herniation.  Recommend emergent neurosurgical consultation.    This report was flagged in Epic as abnormal.    COMMUNICATION  This critical result was discovered/received at 04:15. The critical information above was relayed directly by me by telephone to Juan Ramon Card (charge nurse) relayed to Dr. Braswell on 02/11/2019 at 04:25.    Electronically signed by resident: Ulises Solis  Date:    02/11/2019  Time:    04:06    Electronically signed by: Dennsi Ervin MD  Date:    02/11/2019  Time:    04:59             Narrative:    EXAMINATION:  CT HEAD WITHOUT CONTRAST    CLINICAL HISTORY:  Sub-dural hemorrhage;    TECHNIQUE:  Low dose axial images were obtained through the head.  Coronal and sagittal reformations were also performed. Contrast was not administered.    COMPARISON:  None.    FINDINGS:  Large mixed density subdural collection identified overlying the posterior right cerebral convexity measuring up to 2.1 cm in thickness, findings represent acute subdural hematoma.  Component of  subarachnoid hemorrhage is also seen in the sulci overlying the right cerebral convexity. There is diffuse cerebral edema and mass effect which completely effaces the right lateral ventricle and subfalcine herniation with approximately 1.4 cm leftward midline shift.  There is central herniation with effacement of basilar and prepontine cisterns and fullness of the foramen magnum consistent with tonsillar herniation.    Cranium appears intact.  There is near complete opacification of the nasal cavity, and ethmoid air cells.  Mild mucosal thickening is seen in the frontal and sphenoid sinuses.  Partially visualized maxillary sinuses are essentially clear.                                 Medical Decision Making:   History:   Old Medical Records: I decided to obtain old medical records.  Initial Assessment:   Upon initial evaluation and physical examination at the EMS stretcher the patient was intubated.  Differential Diagnosis:   Differential diagnosis include but not limited to...    Epidural Hematoma   SDH  SAH  ICH  Herniation    Clinical Tests:   Lab Tests: Ordered and Reviewed  Radiological Study: Ordered and Reviewed  Medical Tests: Ordered and Reviewed  ED Management:  59 yo male with pmh of MM found down by family after indeterminate amount of time. Reported non-compliance with chemotherapeutics and outside report of thrombocytopenia at 20k. Upon my initial evaluation and physical examination the patient is unarousable, has bilateral blown and fixed pupils, no movement to pain, and no cranial nerve reflexes. Pt is apneic on pressure support. GCS 3T. I was concerned with significant cerebral edema and active brain herniation, thus 3% Hypertonic and mannitol (per Neuro CC request) where given. Head CT shows large right holohemispheric acute SDH with significant mass effect and 2cm of midline shift and obliteration of basal cisterns. No family present at this time. Patient started to become hypotensive thus  emergent central line and arterial line where placed. Levophed was started. Family (patient's brother arrived) and provided some PMHx. The Neuro Sx team and I reviewed the patient's current critical clinical condition. We discussed the patient's current condition including a concerning neurologic exam concerning for brain herniation. We also discussed the patient's poor prognosis. Patient's brother verbalized understanding and stated that he was not ready to make any decisions at this time. Opportunity was given for the patient's brother to speak and ask questions. He was assured of attention to patient comfort. Emotions expressed by family were acknowledge and addressed.               Scribe Attestation:   Scribe #1: I performed the above scribed service and the documentation accurately describes the services I performed. I attest to the accuracy of the note.               Clinical Impression:   Diagnoses of Unresponsive and SDH (subdural hematoma) were pertinent to this visit.                             Nancy Braswell MD  02/13/19 [id number]

## 2019-02-11 NOTE — SUBJECTIVE & OBJECTIVE
No past medical history on file.  No past surgical history on file.   No current facility-administered medications on file prior to encounter.      No current outpatient medications on file prior to encounter.      Allergies: Iodinated contrast- oral and iv dye    No family history on file.  Social History     Tobacco Use    Smoking status: Not on file   Substance Use Topics    Alcohol use: Not on file    Drug use: Not on file     Review of Systems   Unable to perform ROS: Intubated     Objective:     Vitals:    Temp: 99.5 °F (37.5 °C)  Pulse: 105  BP: (!) 93/48  MAP (mmHg): 68  Resp: (!) 25  SpO2: (!) 94 %  Oxygen Concentration (%): 50  O2 Device (Oxygen Therapy): ventilator  Vent Mode: A/C  Set Rate: 24 bmp  Vt Set: 500 mL  Pressure Support: 0 cmH20  PEEP/CPAP: 5 cmH20  Peak Airway Pressure: 45 cmH2O  Mean Airway Pressure: 19 cmH20  Plateau Pressure: 20 cmH20    Temp  Min: 86.5 °F (30.3 °C)  Max: 100.2 °F (37.9 °C)  Pulse  Min: 100  Max: 136  BP  Min: 69/36  Max: 132/65  MAP (mmHg)  Min: 47  Max: 81  Resp  Min: 16  Max: 36  SpO2  Min: 93 %  Max: 97 %  Oxygen Concentration (%)  Min: 50  Max: 60    02/10 0701 - 02/11 0700  In: 1450 [I.V.:350]  Out: -            Physical Exam   Constitutional: He appears well-developed. He is intubated.   HENT:   Head: Normocephalic.   Eyes:   Pupils fixed dilated   Neck: No JVD present. No tracheal deviation present.   Cardiovascular: Normal rate and regular rhythm.   Pulmonary/Chest: He is intubated.   Abdominal: Soft.   Neurological:   Non responsive to stimulation, does not open eyes  Intubated  Negative Cough/ gag/ corneal response/ no breaths over vent delivered breaths  Pupils fixed dilated  Unable to test sensory  no facial asymmetry           Unable to test orientation, language, memory, judgment, insight, fund of knowledge, hearing, shoulder shrug, tongue protrusion, coordination, gait due to level of consciousness.    Today I personally reviewed pertinent medications,  lines/drains/airways, imaging, laboratory results, notably:

## 2019-02-11 NOTE — PT/OT/SLP PROGRESS
Speech Language Pathology  Discharge Summary      Esteban Manzo  MRN: [id number]    ST to DC orders at this time secondary to patient intubated. ST to await new orders for eval and treatment once patient extubated and medically appropriate.     Emily Abadie, CCC-SLP

## 2019-02-11 NOTE — CHAPLAIN
Facts :  Pt was found by son who has the mental capacity of a 6 yr old; pt may have been unresponsive for quite some time before  911 was called.     Feelings:   Brother, Ariel, is Pentacostal and believes in a God of miracles, he will be very sad if it's his brother's time to go, but he's not ready to let go.    Family/Friends:  Pt. Has a son who he was living with who is mentally disabled, and a daughter in [place] who is  but also somewhat disabled in some way.  All of pts. Siblings (4-5) are disabled in some way except for Ariel.      Isatu : Ariel has deep and abiding isatu that is very alive in him.  Pt. is also a man of isatu.    Future :  Pastoral care availability has been known to Ariel and will continue to offer support.

## 2019-02-11 NOTE — NURSING
NCC at bedside to discuss with family pt's prognosis and plan of care.     Pt to be made a DNR. Family will be coming to see pt. Following family visit the pt will be placed on comfort care.     I will continue to monitor closely and update NCC of any changes.

## 2019-02-11 NOTE — PROGRESS NOTES
[id number] patient transported from ED 1 to bed 9065 via 00% Ambu bagging with oxygen attached. Mask at [facility]. Patient tolerated well no complications noted. Report and care of patient turned over to RT. Todd

## 2019-02-12 NOTE — SIGNIFICANT EVENT
Mr. Manzo 57y/o male with PMHx of Bone cancer, found down today, unknown time he was on the floor and transferred to [facility] due to R subdural hematoma with subfalcine herniation.     General: Patient lying on bed, unresponsive.  Lungs: Absent breathing movements and lung sounds  CV: No cardiac sounds found. Asystole in the monitor  Neuro: No BS reflexes found    Cause of death: R subdural hematoma   Time of death: 22:02 pm      EDWIN Osuna MD  [facility] Neuro critical care

## 2019-02-12 NOTE — NURSING
Patient was on comfort care upon start of shift. Notified BARRETT Naidu of family wishes to withdraw care. Fluids stopped, comfort care monitor on. Morphine drip began at 2045. Patient was then extubated per orders. Patient passed at approximately 2203.  notified. Patient bagged per protocol with all invasive lines used for care. Transport notified. Patient transported to [facility].

## 2019-02-19 NOTE — PHYSICIAN QUERY
PT Name: Esteban Manzo  MR #: [id number]     Physician Query Form - Etiology of Condition Clarification      CDS/: Qing Hoffman RN               Contact information:[email or web address]  This form is a permanent document in the medical record.     Query Date: February 19, 2019    By submitting this query, we are merely seeking further clarification of documentation.  Please utilize your independent clinical judgment when addressing the question(s) below.     The medical record contains the following:    Findings Supporting Clinical Information Location in Medical Record   R subdural hematoma with subfalcine herniation patient's special needs son found him down and with difficulty arousing.  According to the family patient has a low platelet count due to bone cancer.  pupils are fixed, dilated with negative cough, gag or corneal response    Head CT shows large right holohemispheric acute SDH with severe significant mass effect and 2cm of midline shift and obliteration of basal cisterns.Pupils: Blown and fixed bilaterally. Absent Corneal reflexes, cough/gag and oculocephalics    Large acute subdural collection overlying the right cerebral convexity with small component of subarachnoid hemorrhage and diffuse cerebral edema resulting in subfalcine, central, and tonsillar herniation.    Head: Normocephalic and atraumatic.  Neuro critical Care H&P          Neurosurgery consult            Head CT 2/11          ED provider     Please document your best medical opinion regarding the etiology of _the subdural hematoma__ for which treatment is/was directed.     Provider use only    [   ] Subdural hematoma with subfalcine herniation, traumatic    [ x  ] Subdural hematoma with subfalcine herniation,non- traumatic    [   ]  Subdural hematoma with subfalcine herniation related to ________(please specify)    [   ]  Other ___________________________         [  ] Clinically Undetermined     Please document in your  progress notes daily for the duration of treatment, until resolved, and include in your discharge summary.

## 2019-02-23 NOTE — DISCHARGE SUMMARY
[facility]  Neurocritical Care  Discharge Summary    Admit Date: 2/11/2019    Service Date: 02/23/2019    Discharge Date: 2/11/2019    Length of Stay: 1    Final Active Diagnoses:    Diagnosis Date Noted POA    PRINCIPAL PROBLEM:  SDH (subdural hematoma) [S06.5X9A] 02/11/2019 Yes    Crissy coma scale total score 3-8 [R40.2430] 02/11/2019 Yes    Cytotoxic brain edema [G93.6] 02/11/2019 Yes    Brain compression [G93.5] 02/11/2019 Yes    Acute respiratory failure with hypoxia and hypercapnia [J96.01, J96.02] 02/11/2019 Yes    Type 2 diabetes mellitus with hyperglycemia, without long-term current use of insulin [E11.65] 02/11/2019 Yes    Acute blood loss anemia [D62] 02/11/2019 Yes    Neurogenic shock [F43.0] 02/11/2019 Yes      Problems Resolved During this Admission:      History of Present Illness: The patient is a 59 yo male with PMH of Bone Cancer who presents as a transfer from [place] for evaluation and management  of a SDH. He is nonverbal and there is no available family therefore history and events are obtained from medical records. According to EMS, the patient's special needs son found him down and with difficulty arousing. He was taken to [facility] ED for complaint of lethargy. Upon assessment he displayed minimal response to painful stimuli but continued nonverbal with decreased alertness. CT imaging identified L SDH and he was therefore transferred to [facility] for higher level of care. According to the family patient has a low platelet count due to bone cancer with no chemo treatments; there is no other available information relative to history, onset, worsening/associated symptoms.     Upon assessment at [facility], the patient is intubated without sedation, no family at bedside. His pupils are fixed, dilated with negative cough, gag or corneal response. He will be transferred to [facility] for further management.              Hospital Course by Event: 2/11 Admit to [facility], [place], CTH,  IVF.   Family wishes to withdraw care. Extubated, fluids stopped and morphine drip began at . Called for asystole in the monitor around 22:02            Hospital Course by Problem:   * SDH (subdural hematoma)    Admit to [facility]  Neurosurgery Consult  Central Line  Arterial Line  Cardiac Monitoring  Neuro Checks Q1 hr  levetiracetam 500 bid.  Blood Pressure Parameters, SBP < 160.  elevate head of bed 30-45 degrees.  CTH    Na Eunatremic  Glucose Euglycemic           Acute respiratory failure with hypoxia and hypercapnia    intubated     Brain compression    See SDH     Cytotoxic brain edema    See SDH     Crissy coma scale total score 3-8    Patient unresponsive with BS reflexes gone. E 1, V 1 T, M 1       Significant Results:  Imaging:  CTH: Large acute subdural collection overlying the right cerebral convexity with small component of subarachnoid hemorrhage and diffuse cerebral edema resulting in subfalcine, central, and tonsillar herniation.  Recommend emergent neurosurgical consultation    Laboratory:  Lab Results   Component Value Date    HGBA1C 6.4 (H) 2019    CHOL 86 (L) 2019    HDL 24 (L) 2019    LDLCALC 33.0 (L) 2019    TRIG 145 2019    TSH 0.585 2019    TSH 0.585 2019       Pending Results: None    Consultations:  IP CONSULT TO NEUROSURGERY  IP CONSULT TO PHYSICAL MEDICINE REHAB      Procedures:   No surgery found      Medications:   There are no discharge medications for this patient.     Diet: None    Activity: None    Disposition:     This discharge took less than 30 minutes to complete.    Ortiz Walter MD  Neurocritical Care  [facility]

## 2019-02-23 NOTE — ASSESSMENT & PLAN NOTE
Admit to [facility]  Neurosurgery Consult  Central Line  Arterial Line  Cardiac Monitoring  Neuro Checks Q1 hr  levetiracetam 500 bid.  Blood Pressure Parameters, SBP < 160.  elevate head of bed 30-45 degrees.  CTH    Na Eunatremic  Glucose Euglycemic